# Patient Record
Sex: MALE | Race: WHITE | NOT HISPANIC OR LATINO | Employment: FULL TIME | URBAN - METROPOLITAN AREA
[De-identification: names, ages, dates, MRNs, and addresses within clinical notes are randomized per-mention and may not be internally consistent; named-entity substitution may affect disease eponyms.]

---

## 2020-10-30 ENCOUNTER — OFFICE VISIT (OUTPATIENT)
Dept: FAMILY MEDICINE CLINIC | Facility: CLINIC | Age: 30
End: 2020-10-30

## 2020-10-30 ENCOUNTER — LAB (OUTPATIENT)
Dept: FAMILY MEDICINE CLINIC | Facility: CLINIC | Age: 30
End: 2020-10-30

## 2020-10-30 VITALS
TEMPERATURE: 98.6 F | HEIGHT: 68 IN | SYSTOLIC BLOOD PRESSURE: 127 MMHG | HEART RATE: 89 BPM | DIASTOLIC BLOOD PRESSURE: 80 MMHG | BODY MASS INDEX: 30.92 KG/M2 | OXYGEN SATURATION: 97 % | WEIGHT: 204 LBS

## 2020-10-30 DIAGNOSIS — R51.9 NONINTRACTABLE EPISODIC HEADACHE, UNSPECIFIED HEADACHE TYPE: Primary | ICD-10-CM

## 2020-10-30 DIAGNOSIS — Z23 ENCOUNTER FOR IMMUNIZATION: ICD-10-CM

## 2020-10-30 LAB
ALBUMIN SERPL-MCNC: 4.8 G/DL (ref 3.5–5.2)
ALBUMIN/GLOB SERPL: 2.1 G/DL
ALP SERPL-CCNC: 73 U/L (ref 39–117)
ALT SERPL W P-5'-P-CCNC: 21 U/L (ref 1–41)
ANION GAP SERPL CALCULATED.3IONS-SCNC: 8.4 MMOL/L (ref 5–15)
AST SERPL-CCNC: 21 U/L (ref 1–40)
BASOPHILS # BLD AUTO: 0.05 10*3/MM3 (ref 0–0.2)
BASOPHILS NFR BLD AUTO: 0.8 % (ref 0–1.5)
BILIRUB SERPL-MCNC: 0.5 MG/DL (ref 0–1.2)
BUN SERPL-MCNC: 12 MG/DL (ref 6–20)
BUN/CREAT SERPL: 12.1 (ref 7–25)
CALCIUM SPEC-SCNC: 10.3 MG/DL (ref 8.6–10.5)
CHLORIDE SERPL-SCNC: 105 MMOL/L (ref 98–107)
CO2 SERPL-SCNC: 29.6 MMOL/L (ref 22–29)
CREAT SERPL-MCNC: 0.99 MG/DL (ref 0.76–1.27)
DEPRECATED RDW RBC AUTO: 41.1 FL (ref 37–54)
EOSINOPHIL # BLD AUTO: 0.17 10*3/MM3 (ref 0–0.4)
EOSINOPHIL NFR BLD AUTO: 2.8 % (ref 0.3–6.2)
ERYTHROCYTE [DISTWIDTH] IN BLOOD BY AUTOMATED COUNT: 12.6 % (ref 12.3–15.4)
GFR SERPL CREATININE-BSD FRML MDRD: 90 ML/MIN/1.73
GLOBULIN UR ELPH-MCNC: 2.3 GM/DL
GLUCOSE SERPL-MCNC: 90 MG/DL (ref 65–99)
HCT VFR BLD AUTO: 44 % (ref 37.5–51)
HGB BLD-MCNC: 14.7 G/DL (ref 13–17.7)
IMM GRANULOCYTES # BLD AUTO: 0.02 10*3/MM3 (ref 0–0.05)
IMM GRANULOCYTES NFR BLD AUTO: 0.3 % (ref 0–0.5)
LYMPHOCYTES # BLD AUTO: 2.28 10*3/MM3 (ref 0.7–3.1)
LYMPHOCYTES NFR BLD AUTO: 37.6 % (ref 19.6–45.3)
MCH RBC QN AUTO: 30.2 PG (ref 26.6–33)
MCHC RBC AUTO-ENTMCNC: 33.4 G/DL (ref 31.5–35.7)
MCV RBC AUTO: 90.5 FL (ref 79–97)
MONOCYTES # BLD AUTO: 0.42 10*3/MM3 (ref 0.1–0.9)
MONOCYTES NFR BLD AUTO: 6.9 % (ref 5–12)
NEUTROPHILS NFR BLD AUTO: 3.13 10*3/MM3 (ref 1.7–7)
NEUTROPHILS NFR BLD AUTO: 51.6 % (ref 42.7–76)
NRBC BLD AUTO-RTO: 0 /100 WBC (ref 0–0.2)
PLATELET # BLD AUTO: 288 10*3/MM3 (ref 140–450)
PMV BLD AUTO: 10 FL (ref 6–12)
POTASSIUM SERPL-SCNC: 4 MMOL/L (ref 3.5–5.2)
PROT SERPL-MCNC: 7.1 G/DL (ref 6–8.5)
RBC # BLD AUTO: 4.86 10*6/MM3 (ref 4.14–5.8)
SODIUM SERPL-SCNC: 143 MMOL/L (ref 136–145)
WBC # BLD AUTO: 6.07 10*3/MM3 (ref 3.4–10.8)

## 2020-10-30 PROCEDURE — 80053 COMPREHEN METABOLIC PANEL: CPT | Performed by: FAMILY MEDICINE

## 2020-10-30 PROCEDURE — 36415 COLL VENOUS BLD VENIPUNCTURE: CPT | Performed by: FAMILY MEDICINE

## 2020-10-30 PROCEDURE — 85025 COMPLETE CBC W/AUTO DIFF WBC: CPT | Performed by: FAMILY MEDICINE

## 2020-10-30 PROCEDURE — 90686 IIV4 VACC NO PRSV 0.5 ML IM: CPT | Performed by: FAMILY MEDICINE

## 2020-10-30 PROCEDURE — 99213 OFFICE O/P EST LOW 20 MIN: CPT | Performed by: FAMILY MEDICINE

## 2020-10-30 PROCEDURE — 90471 IMMUNIZATION ADMIN: CPT | Performed by: FAMILY MEDICINE

## 2020-10-30 NOTE — PROGRESS NOTES
"  Subjective:     Ambrose Powers is a 29 y.o. male who presents for  Chief Complaint   Patient presents with   • Headache     new pt- c/o migraines        This is a new patient to me.    HPI    Obese  male presents with complaints of headaches for the last 3 weeks, typically during the work week.  Headaches typically begins midday, reaches maximum intensity by 6pm, and then gradually improves later in the evening.  Patient believes improvement of headaches in the evenings may be related to an opportunity to decompress following work.  Reports he has a 20-minute drive home that is relaxing.  Headaches are localized to the crown.  Described as a dull ache. Associated symptoms include lightheadedness, sensitivity to light, and visual disturbance, \"out of focus\".  Reports episodes of self-limiting, sharp, electric-like pain at the site of his headache during the course of the day in recent days.  Cannot recall any stressors or life changes. Patient works first shift and eats lunch around 1 pm; drinks ~60 oz of water daily. Therapy initiated at home includes Excedrin migraine, 1 tablet daily since he cannot tolerate caffeine.  Although Excedrin helps with headache, Excedrin does not help lightheadedness. Has adjusted the blue filter on his phone and computer and has appreciated some benefit.  Denies any history of head trauma.    Past Medical Hx:  History reviewed. No pertinent past medical history.    Past Surgical Hx:  History reviewed. No pertinent surgical history.    Family Hx:  Family History   Problem Relation Age of Onset   • No Known Problems Mother    • No Known Problems Father         Social History:  Social History     Socioeconomic History   • Marital status: Single     Spouse name: Not on file   • Number of children: Not on file   • Years of education: Not on file   • Highest education level: Not on file   Tobacco Use   • Smoking status: Current Some Day Smoker     Packs/day: 1.00     Years: 3.00 " "    Pack years: 3.00     Types: Cigarettes     Last attempt to quit: 2018     Years since quittin.7   • Smokeless tobacco: Never Used   • Tobacco comment: currently using E-cigarette, 6 mg   Substance and Sexual Activity   • Alcohol use: Yes     Frequency: Monthly or less     Comment: 2-4 drinks every other week   • Drug use: Never   • Sexual activity: Yes     Partners: Female     Birth control/protection: Condom       Allergies:  Patient has no known allergies.    Current Meds:  No current outpatient medications on file.    The following portions of the patient's history were reviewed and updated as appropriate: allergies, current medications, past family history, past medical history, past social history, past surgical history and problem list.    Review of Systems  Review of Systems   Constitutional: Negative for chills, diaphoresis, fatigue and fever.   HENT: Negative for congestion, rhinorrhea, sinus pressure, sneezing and sore throat.    Eyes: Positive for visual disturbance. Negative for blurred vision, double vision and redness.   Respiratory: Negative for cough, shortness of breath and wheezing.    Cardiovascular: Negative for chest pain and leg swelling.   Gastrointestinal: Negative for abdominal pain, constipation, diarrhea, nausea and vomiting.   Genitourinary: Negative for difficulty urinating, dysuria and hematuria.   Musculoskeletal: Negative for arthralgias, gait problem and myalgias.   Skin: Negative for rash and skin lesions.   Allergic/Immunologic: Positive for environmental allergies.   Neurological: Positive for light-headedness and headache. Negative for tremors, seizures and syncope.   Psychiatric/Behavioral: Negative for sleep disturbance and depressed mood. The patient is not nervous/anxious.        Objective:     /80 (BP Location: Left arm, Patient Position: Sitting, Cuff Size: Large Adult)   Pulse 89   Temp 98.6 °F (37 °C) (Infrared)   Ht 172.7 cm (68\")   Wt 92.5 kg (204 " lb)   SpO2 97%   BMI 31.02 kg/m²     Physical Exam  Vitals signs reviewed.   Constitutional:       General: He is not in acute distress.     Appearance: He is well-developed. He is obese. He is not diaphoretic.   HENT:      Head: Normocephalic and atraumatic.      Right Ear: Tympanic membrane and ear canal normal.      Left Ear: Tympanic membrane and ear canal normal.      Nose: Nose normal.      Mouth/Throat:      Mouth: Mucous membranes are moist.      Pharynx: Oropharynx is clear.   Eyes:      General: No scleral icterus.     Conjunctiva/sclera: Conjunctivae normal.      Pupils: Pupils are equal, round, and reactive to light.   Neck:      Musculoskeletal: Neck supple.      Thyroid: No thyromegaly.      Trachea: No tracheal deviation.   Cardiovascular:      Rate and Rhythm: Normal rate and regular rhythm.      Heart sounds: Normal heart sounds.   Pulmonary:      Effort: Pulmonary effort is normal.      Breath sounds: Normal breath sounds. No wheezing.   Lymphadenopathy:      Cervical: No cervical adenopathy.   Skin:     General: Skin is warm and dry.      Capillary Refill: Capillary refill takes less than 2 seconds.      Findings: No rash.   Neurological:      Mental Status: He is alert and oriented to person, place, and time.      Cranial Nerves: Cranial nerves are intact.      Deep Tendon Reflexes:      Reflex Scores:       Bicep reflexes are 2+ on the right side and 2+ on the left side.       Patellar reflexes are 3+ on the right side and 3+ on the left side.  Psychiatric:         Mood and Affect: Mood normal.         Behavior: Behavior normal.         Lab Results   Component Value Date    HGB 15.0 05/27/2020     No results found for: GLUCOSE, BUN, CREATININE, EGFRIFNONA, EGFRIFAFRI, BCR, K, CO2, CALCIUM, PROTENTOTREF, ALBUMIN, LABIL2, BILIRUBIN, AST, ALT     Assessment/Plan:     Diagnoses and all orders for this visit:    1. Nonintractable episodic headache, unspecified headache type  (Primary)  Comments:  Migraine vs. tension headaches.  Try diclofenac & < 3 g acetaminophen/24 hr for abortive tx.  Head CT to evaluate visual disturbances.  Pt to monitor headaches.  Orders:  -     CT Head Without Contrast  -     diclofenac (VOLTAREN) 50 MG EC tablet; Take 1 tablet by mouth 2 (Two) Times a Day As Needed (Headache). Take with food!  Dispense: 60 tablet; Refill: 1  -     CBC w AUTO Differential  -     Comprehensive metabolic panel  -     CBC Auto Differential    2. Encounter for immunization  -     Fluarix Quad >6 Months ([id number])      Follow-up:     Return in about 6 weeks (around 12/11/2020) for Recheck headaches.      Signature    Sonam Hoarn MD  Family Medicine  [facility]        This document has been electronically signed by Sonam Horan MD on October 30, 2020 09:03 EDT

## 2020-10-30 NOTE — PATIENT INSTRUCTIONS
Recommend no more than 3000 mg Tylenol (acetaminophen) in 24 hours. Extra strength Tylenol (acetaminophen) is 500 mg. You can take 2 tablets up to 3 times per day as needed for pain.       Form - Headache Record  There are many types and causes of headaches. A headache record can help guide your treatment plan. Use this form to record the details. Bring this form with you to your follow-up visits.  Follow your health care provider's instructions on how to describe your headache. You may be asked to:  · Use a pain scale. This is a tool to rate the intensity of your headache using words or numbers.  · Describe what your headache feels like, such as dull, achy, throbbing, or sharp.  Headache record  Date: _______________ Time (from start to end): ____________________ Location of the headache: _________________________  · Intensity of the headache: ____________________ Description of the headache: ______________________________________________________________  · Hours of sleep the night before the headache: __________  · Food or drinks before the headache started: ______________________________________________________________________________________  · Events before the headache started: _______________________________________________________________________________________________  · Symptoms before the headache started: __________________________________________________________________________________________  · Symptoms during the headache: __________________________________________________________________________________________________  · Treatment: ________________________________________________________________________________________________________________  · Effect of treatment: _________________________________________________________________________________________________________  · Other comments:  ___________________________________________________________________________________________________________  Date: _______________ Time (from start to end): ____________________ Location of the headache: _________________________  · Intensity of the headache: ____________________ Description of the headache: ______________________________________________________________  · Hours of sleep the night before the headache: __________  · Food or drinks before the headache started: ______________________________________________________________________________________  · Events before the headache started: ____________________________________________________________________________________________  · Symptoms before the headache started: _________________________________________________________________________________________  · Symptoms during the headache: _______________________________________________________________________________________________  · Treatment: ________________________________________________________________________________________________________________  · Effect of treatment: _________________________________________________________________________________________________________  · Other comments: ___________________________________________________________________________________________________________  Date: _______________ Time (from start to end): ____________________ Location of the headache: _________________________  · Intensity of the headache: ____________________ Description of the headache: ______________________________________________________________  · Hours of sleep the night before the headache: __________  · Food or drinks before the headache started: ______________________________________________________________________________________  · Events before the headache started: ____________________________________________________________________________________________  · Symptoms  before the headache started: _________________________________________________________________________________________  · Symptoms during the headache: _______________________________________________________________________________________________  · Treatment: ________________________________________________________________________________________________________________  · Effect of treatment: _________________________________________________________________________________________________________  · Other comments: ___________________________________________________________________________________________________________  Date: _______________ Time (from start to end): ____________________ Location of the headache: _________________________  · Intensity of the headache: ____________________ Description of the headache: ______________________________________________________________  · Hours of sleep the night before the headache: _________  · Food or drinks before the headache started: ______________________________________________________________________________________  · Events before the headache started: ____________________________________________________________________________________________  · Symptoms before the headache started: _________________________________________________________________________________________  · Symptoms during the headache: _______________________________________________________________________________________________  · Treatment: ________________________________________________________________________________________________________________  · Effect of treatment: _________________________________________________________________________________________________________  · Other comments: ___________________________________________________________________________________________________________  Date: _______________ Time (from start to end): ____________________ Location of  the headache: _________________________  · Intensity of the headache: ____________________ Description of the headache: ______________________________________________________________  · Hours of sleep the night before the headache: _________  · Food or drinks before the headache started: ______________________________________________________________________________________  · Events before the headache started: ____________________________________________________________________________________________  · Symptoms before the headache started: _________________________________________________________________________________________  · Symptoms during the headache: _______________________________________________________________________________________________  · Treatment: ________________________________________________________________________________________________________________  · Effect of treatment: _________________________________________________________________________________________________________  · Other comments: ___________________________________________________________________________________________________________  This information is not intended to replace advice given to you by your health care provider. Make sure you discuss any questions you have with your health care provider.  Document Released: 01/06/2020 Document Revised: 01/06/2020 Document Reviewed: 01/06/2020  Elsevier Patient Education © 2020 [employer]    General Headache Without Cause  A headache is pain or discomfort felt around the head or neck area. The specific cause of a headache may not be found. There are many causes and types of headaches. A few common ones are:  · Tension headaches.  · Migraine headaches.  · Cluster headaches.  · Chronic daily headaches.  Follow these instructions at home:  Watch your condition for any changes. Let your health care provider know about them. Take these steps to help with your  condition:  Managing pain         · Take over-the-counter and prescription medicines only as told by your health care provider.  · Lie down in a dark, quiet room when you have a headache.  · If directed, put ice on your head and neck area:  ? Put ice in a plastic bag.  ? Place a towel between your skin and the bag.  ? Leave the ice on for 20 minutes, 2-3 times per day.  · If directed, apply heat to the affected area. Use the heat source that your health care provider recommends, such as a moist heat pack or a heating pad.  ? Place a towel between your skin and the heat source.  ? Leave the heat on for 20-30 minutes.  ? Remove the heat if your skin turns bright red. This is especially important if you are unable to feel pain, heat, or cold. You may have a greater risk of getting burned.  · Keep lights dim if bright lights bother you or make your headaches worse.  Eating and drinking  · Eat meals on a regular schedule.  · If you drink alcohol:  ? Limit how much you use to:  § 0-1 drink a day for women.  § 0-2 drinks a day for men.  ? Be aware of how much alcohol is in your drink. In the [place] one drink equals one 12 oz bottle of beer (355 mL), one 5 oz glass of wine (148 mL), or one 1½ oz glass of hard liquor (44 mL).  · Stop drinking caffeine, or decrease the amount of caffeine you drink.  General instructions    · Keep a headache journal to help find out what may trigger your headaches. For example, write down:  ? What you eat and drink.  ? How much sleep you get.  ? Any change to your diet or medicines.  · Try massage or other relaxation techniques.  · Limit stress.  · Sit up straight, and do not tense your muscles.  · Do not use any products that contain nicotine or tobacco, such as cigarettes, e-cigarettes, and chewing tobacco. If you need help quitting, ask your health care provider.  · Exercise regularly as told by your health care provider.  · Sleep on a regular schedule. Get 7-9 hours of sleep each night, or  the amount recommended by your health care provider.  · Keep all follow-up visits as told by your health care provider. This is important.  Contact a health care provider if:  · Your symptoms are not helped by medicine.  · You have a headache that is different from the usual headache.  · You have nausea or you vomit.  · You have a fever.  Get help right away if:  · Your headache becomes severe quickly.  · Your headache gets worse after moderate to intense physical activity.  · You have repeated vomiting.  · You have a stiff neck.  · You have a loss of vision.  · You have problems with speech.  · You have pain in the eye or ear.  · You have muscular weakness or loss of muscle control.  · You lose your balance or have trouble walking.  · You feel faint or pass out.  · You have confusion.  · You have a seizure.  Summary  · A headache is pain or discomfort felt around the head or neck area.  · There are many causes and types of headaches. In some cases, the cause may not be found.  · Keep a headache journal to help find out what may trigger your headaches. Watch your condition for any changes. Let your health care provider know about them.  · Contact a health care provider if you have a headache that is different from the usual headache, or if your symptoms are not helped by medicine.  · Get help right away if your headache becomes severe, you vomit, you have a loss of vision, you lose your balance, or you have a seizure.  This information is not intended to replace advice given to you by your health care provider. Make sure you discuss any questions you have with your health care provider.  Document Released: 12/18/2006 Document Revised: 07/08/2019 Document Reviewed: 07/08/2019  Elsevier Patient Education © 2020 [employer] Inc[employer]

## 2020-11-16 ENCOUNTER — HOSPITAL ENCOUNTER (OUTPATIENT)
Dept: CT IMAGING | Facility: HOSPITAL | Age: 30
Discharge: HOME | End: 2020-11-16
Admitting: FAMILY MEDICINE

## 2020-11-16 PROCEDURE — 70450 CT HEAD/BRAIN W/O DYE: CPT

## 2020-12-11 ENCOUNTER — OFFICE VISIT (OUTPATIENT)
Dept: FAMILY MEDICINE CLINIC | Facility: CLINIC | Age: 30
End: 2020-12-11

## 2020-12-11 VITALS
BODY MASS INDEX: 31.52 KG/M2 | WEIGHT: 208 LBS | DIASTOLIC BLOOD PRESSURE: 89 MMHG | HEART RATE: 83 BPM | HEIGHT: 68 IN | TEMPERATURE: 97.5 F | SYSTOLIC BLOOD PRESSURE: 131 MMHG | OXYGEN SATURATION: 99 %

## 2020-12-11 DIAGNOSIS — R51.9 NONINTRACTABLE EPISODIC HEADACHE, UNSPECIFIED HEADACHE TYPE: Primary | ICD-10-CM

## 2020-12-11 PROCEDURE — 99213 OFFICE O/P EST LOW 20 MIN: CPT | Performed by: FAMILY MEDICINE

## 2020-12-11 NOTE — PROGRESS NOTES
Subjective:     Ambrose Powers is a 29 y.o. male who presents for  Chief Complaint   Patient presents with   • Headache     follow up        This is a known patient to me.     Obese  male presents with complaints of daily headaches presents for follow up. Headaches were occuring without warning most afternoons and then improve as patient returned home from work.  Underwent a head CT that was unremarkable.  Patient was started on diclofenac BID PRN. Reports that headache frequency has decreased to once a week. Initially taking NSAID daily but since frequency has decrease, need for NSAID has decreased. Reports significant benefit from NSAID, headache resolved within an hour. Patient reports that he could not identify any triggers but has since been trying to get adequate sleep and exercise.     Past Medical Hx:  History reviewed. No pertinent past medical history.    Past Surgical Hx:  History reviewed. No pertinent surgical history.    Family Hx:  Family History   Problem Relation Age of Onset   • No Known Problems Mother    • No Known Problems Father         Social History:  Social History     Socioeconomic History   • Marital status: Single     Spouse name: Not on file   • Number of children: Not on file   • Years of education: Not on file   • Highest education level: Not on file   Tobacco Use   • Smoking status: Former Smoker     Packs/day: 1.00     Years: 3.00     Pack years: 3.00     Types: Cigarettes     Quit date: 2018     Years since quittin.8   • Smokeless tobacco: Never Used   • Tobacco comment: currently using E-cigarette, 6 mg   Substance and Sexual Activity   • Alcohol use: Yes     Frequency: Monthly or less     Comment: 2-4 drinks every other week   • Drug use: Never   • Sexual activity: Yes     Partners: Female     Birth control/protection: Condom       Allergies:  Patient has no known allergies.    Current Meds:    Current Outpatient Medications:   •  diclofenac (VOLTAREN) 50 MG  "EC tablet, Take 1 tablet by mouth 2 (Two) Times a Day As Needed (Headache). Take with food!, Disp: 60 tablet, Rfl: 1    The following portions of the patient's history were reviewed and updated as appropriate: allergies, current medications, past family history, past medical history, past social history, past surgical history and problem list.    Review of Systems  Review of Systems   Constitutional: Negative for chills, diaphoresis, fatigue and fever.   HENT: Negative for congestion, rhinorrhea, sinus pressure, sneezing and sore throat.    Eyes: Negative for blurred vision, double vision, redness and visual disturbance.   Respiratory: Negative for cough, shortness of breath and wheezing.    Cardiovascular: Negative for chest pain and leg swelling.   Gastrointestinal: Negative for abdominal pain, constipation, diarrhea, nausea and vomiting.   Genitourinary: Negative for difficulty urinating, dysuria and hematuria.   Musculoskeletal: Negative for arthralgias, gait problem and myalgias.   Skin: Negative for rash and skin lesions.   Allergic/Immunologic: Negative for environmental allergies.   Neurological: Positive for headache. Negative for tremors, seizures, syncope and light-headedness.   Psychiatric/Behavioral: Negative for sleep disturbance and depressed mood. The patient is not nervous/anxious.        Objective:     /89 (BP Location: Left arm, Patient Position: Sitting, Cuff Size: Large Adult)   Pulse 83   Temp 97.5 °F (36.4 °C) (Infrared)   Ht 172.7 cm (68\")   Wt 94.3 kg (208 lb)   SpO2 99%   BMI 31.63 kg/m²     Physical Exam  Vitals signs reviewed.   Constitutional:       General: He is not in acute distress.     Appearance: He is well-developed. He is obese. He is not diaphoretic.   HENT:      Head: Normocephalic and atraumatic.   Eyes:      General: No scleral icterus.     Extraocular Movements: Extraocular movements intact.      Conjunctiva/sclera: Conjunctivae normal.   Neck:      " Musculoskeletal: Normal range of motion.   Cardiovascular:      Rate and Rhythm: Normal rate and regular rhythm.      Heart sounds: Normal heart sounds.   Pulmonary:      Effort: Pulmonary effort is normal.      Breath sounds: Normal breath sounds. No wheezing.   Abdominal:      General: Bowel sounds are normal.      Palpations: Abdomen is soft.      Tenderness: There is no abdominal tenderness.   Skin:     General: Skin is warm and dry.      Capillary Refill: Capillary refill takes less than 2 seconds.      Findings: No rash.   Neurological:      Mental Status: He is alert and oriented to person, place, and time.   Psychiatric:         Mood and Affect: Mood normal.         Behavior: Behavior normal.         Lab Results   Component Value Date    WBC 6.07 10/30/2020    HGB 14.7 10/30/2020    HCT 44.0 10/30/2020    MCV 90.5 10/30/2020     10/30/2020     Lab Results   Component Value Date    GLUCOSE 90 10/30/2020    BUN 12 10/30/2020    CREATININE 0.99 10/30/2020    EGFRIFNONA 90 10/30/2020    BCR 12.1 10/30/2020    K 4.0 10/30/2020    CO2 29.6 (H) 10/30/2020    CALCIUM 10.3 10/30/2020    ALBUMIN 4.80 10/30/2020    AST 21 10/30/2020    ALT 21 10/30/2020        Assessment/Plan:     Diagnoses and all orders for this visit:    1. Nonintractable episodic headache, unspecified headache type (Primary)  Comments:  Continue PRN NSAID.  Encouraged supportive care: adequate hydration, sleep, & exercise.  Educational material in AVS.  RTO if symptoms worsen.      Follow-up:     Return in about 6 months (around 6/11/2021) for Annual Physical Exam.      Signature    Sonam Horan MD  Family Medicine  [facility]        This document has been electronically signed by Sonam Horan MD on December 11, 2020 08:14 EST

## 2020-12-11 NOTE — PATIENT INSTRUCTIONS
General Headache Without Cause  A headache is pain or discomfort felt around the head or neck area. The specific cause of a headache may not be found. There are many causes and types of headaches. A few common ones are:  · Tension headaches.  · Migraine headaches.  · Cluster headaches.  · Chronic daily headaches.  Follow these instructions at home:  Watch your condition for any changes. Let your health care provider know about them. Take these steps to help with your condition:  Managing pain         · Take over-the-counter and prescription medicines only as told by your health care provider.  · Lie down in a dark, quiet room when you have a headache.  · If directed, put ice on your head and neck area:  ? Put ice in a plastic bag.  ? Place a towel between your skin and the bag.  ? Leave the ice on for 20 minutes, 2-3 times per day.  · If directed, apply heat to the affected area. Use the heat source that your health care provider recommends, such as a moist heat pack or a heating pad.  ? Place a towel between your skin and the heat source.  ? Leave the heat on for 20-30 minutes.  ? Remove the heat if your skin turns bright red. This is especially important if you are unable to feel pain, heat, or cold. You may have a greater risk of getting burned.  · Keep lights dim if bright lights bother you or make your headaches worse.  Eating and drinking  · Eat meals on a regular schedule.  · If you drink alcohol:  ? Limit how much you use to:  § 0-1 drink a day for women.  § 0-2 drinks a day for men.  ? Be aware of how much alcohol is in your drink. In the [place] one drink equals one 12 oz bottle of beer (355 mL), one 5 oz glass of wine (148 mL), or one 1½ oz glass of hard liquor (44 mL).  · Stop drinking caffeine, or decrease the amount of caffeine you drink.  General instructions    · Keep a headache journal to help find out what may trigger your headaches. For example, write down:  ? What you eat and drink.  ? How much  sleep you get.  ? Any change to your diet or medicines.  · Try massage or other relaxation techniques.  · Limit stress.  · Sit up straight, and do not tense your muscles.  · Do not use any products that contain nicotine or tobacco, such as cigarettes, e-cigarettes, and chewing tobacco. If you need help quitting, ask your health care provider.  · Exercise regularly as told by your health care provider.  · Sleep on a regular schedule. Get 7-9 hours of sleep each night, or the amount recommended by your health care provider.  · Keep all follow-up visits as told by your health care provider. This is important.  Contact a health care provider if:  · Your symptoms are not helped by medicine.  · You have a headache that is different from the usual headache.  · You have nausea or you vomit.  · You have a fever.  Get help right away if:  · Your headache becomes severe quickly.  · Your headache gets worse after moderate to intense physical activity.  · You have repeated vomiting.  · You have a stiff neck.  · You have a loss of vision.  · You have problems with speech.  · You have pain in the eye or ear.  · You have muscular weakness or loss of muscle control.  · You lose your balance or have trouble walking.  · You feel faint or pass out.  · You have confusion.  · You have a seizure.  Summary  · A headache is pain or discomfort felt around the head or neck area.  · There are many causes and types of headaches. In some cases, the cause may not be found.  · Keep a headache journal to help find out what may trigger your headaches. Watch your condition for any changes. Let your health care provider know about them.  · Contact a health care provider if you have a headache that is different from the usual headache, or if your symptoms are not helped by medicine.  · Get help right away if your headache becomes severe, you vomit, you have a loss of vision, you lose your balance, or you have a seizure.  This information is not  intended to replace advice given to you by your health care provider. Make sure you discuss any questions you have with your health care provider.  Document Revised: 07/08/2019 Document Reviewed: 07/08/2019  Elsevier Patient Education © 2020 [employer]

## 2021-01-24 ENCOUNTER — PATIENT MESSAGE (OUTPATIENT)
Dept: FAMILY MEDICINE CLINIC | Facility: CLINIC | Age: 31
End: 2021-01-24

## 2021-01-25 NOTE — TELEPHONE ENCOUNTER
From: Ambrose Powers  To: Sonam Horan MD  Sent: 1/24/2021 4:10 PM EST  Subject: Non-Urgent Medical Question    I am having a little pain and a little pressure in my chest when I bend over or move in certain ways. Is this just due acid reflux or is it something I need to visit the doctor for?    Thank you

## 2021-01-27 ENCOUNTER — HOSPITAL ENCOUNTER (EMERGENCY)
Facility: HOSPITAL | Age: 31
Discharge: HOME | End: 2021-01-27
Admitting: EMERGENCY MEDICINE

## 2021-01-27 ENCOUNTER — APPOINTMENT (OUTPATIENT)
Dept: GENERAL RADIOLOGY | Facility: HOSPITAL | Age: 31
End: 2021-01-27

## 2021-01-27 VITALS
RESPIRATION RATE: 16 BRPM | WEIGHT: 203.71 LBS | TEMPERATURE: 98.1 F | HEART RATE: 79 BPM | DIASTOLIC BLOOD PRESSURE: 74 MMHG | SYSTOLIC BLOOD PRESSURE: 122 MMHG | HEIGHT: 70 IN | OXYGEN SATURATION: 98 % | BODY MASS INDEX: 29.16 KG/M2

## 2021-01-27 DIAGNOSIS — K21.00 GASTROESOPHAGEAL REFLUX DISEASE WITH ESOPHAGITIS WITHOUT HEMORRHAGE: Primary | ICD-10-CM

## 2021-01-27 DIAGNOSIS — R07.89 ATYPICAL CHEST PAIN: ICD-10-CM

## 2021-01-27 LAB
ALBUMIN SERPL-MCNC: 4.9 G/DL (ref 3.5–5.2)
ALBUMIN/GLOB SERPL: 1.7 G/DL
ALP SERPL-CCNC: 98 U/L (ref 39–117)
ALT SERPL W P-5'-P-CCNC: 16 U/L (ref 1–41)
ANION GAP SERPL CALCULATED.3IONS-SCNC: 14 MMOL/L (ref 5–15)
AST SERPL-CCNC: 18 U/L (ref 1–40)
BASOPHILS # BLD AUTO: 0 10*3/MM3 (ref 0–0.2)
BASOPHILS NFR BLD AUTO: 0.4 % (ref 0–1.5)
BILIRUB SERPL-MCNC: 0.6 MG/DL (ref 0–1.2)
BUN SERPL-MCNC: 15 MG/DL (ref 6–20)
BUN/CREAT SERPL: 12.7 (ref 7–25)
CALCIUM SPEC-SCNC: 10.3 MG/DL (ref 8.6–10.5)
CHLORIDE SERPL-SCNC: 102 MMOL/L (ref 98–107)
CO2 SERPL-SCNC: 26 MMOL/L (ref 22–29)
CREAT SERPL-MCNC: 1.18 MG/DL (ref 0.76–1.27)
D DIMER PPP FEU-MCNC: <0.19 MG/L (FEU) (ref 0–0.59)
DEPRECATED RDW RBC AUTO: 40.7 FL (ref 37–54)
EOSINOPHIL # BLD AUTO: 0.1 10*3/MM3 (ref 0–0.4)
EOSINOPHIL NFR BLD AUTO: 1.8 % (ref 0.3–6.2)
ERYTHROCYTE [DISTWIDTH] IN BLOOD BY AUTOMATED COUNT: 12.9 % (ref 12.3–15.4)
GFR SERPL CREATININE-BSD FRML MDRD: 73 ML/MIN/1.73
GLOBULIN UR ELPH-MCNC: 2.9 GM/DL
GLUCOSE SERPL-MCNC: 96 MG/DL (ref 65–99)
HCT VFR BLD AUTO: 44.9 % (ref 37.5–51)
HGB BLD-MCNC: 15.6 G/DL (ref 13–17.7)
HOLD SPECIMEN: NORMAL
LYMPHOCYTES # BLD AUTO: 1.6 10*3/MM3 (ref 0.7–3.1)
LYMPHOCYTES NFR BLD AUTO: 23 % (ref 19.6–45.3)
MCH RBC QN AUTO: 31.3 PG (ref 26.6–33)
MCHC RBC AUTO-ENTMCNC: 34.7 G/DL (ref 31.5–35.7)
MCV RBC AUTO: 90.4 FL (ref 79–97)
MONOCYTES # BLD AUTO: 0.3 10*3/MM3 (ref 0.1–0.9)
MONOCYTES NFR BLD AUTO: 5 % (ref 5–12)
NEUTROPHILS NFR BLD AUTO: 4.8 10*3/MM3 (ref 1.7–7)
NEUTROPHILS NFR BLD AUTO: 69.8 % (ref 42.7–76)
NRBC BLD AUTO-RTO: 0 /100 WBC (ref 0–0.2)
NT-PROBNP SERPL-MCNC: 12.8 PG/ML (ref 0–450)
PLATELET # BLD AUTO: 313 10*3/MM3 (ref 140–450)
PMV BLD AUTO: 8.3 FL (ref 6–12)
POTASSIUM SERPL-SCNC: 3.8 MMOL/L (ref 3.5–5.2)
PROT SERPL-MCNC: 7.8 G/DL (ref 6–8.5)
RBC # BLD AUTO: 4.97 10*6/MM3 (ref 4.14–5.8)
SODIUM SERPL-SCNC: 142 MMOL/L (ref 136–145)
TROPONIN T SERPL-MCNC: <0.01 NG/ML (ref 0–0.03)
WBC # BLD AUTO: 6.8 10*3/MM3 (ref 3.4–10.8)
WHOLE BLOOD HOLD SPECIMEN: NORMAL

## 2021-01-27 PROCEDURE — 84484 ASSAY OF TROPONIN QUANT: CPT | Performed by: NURSE PRACTITIONER

## 2021-01-27 PROCEDURE — 85025 COMPLETE CBC W/AUTO DIFF WBC: CPT | Performed by: NURSE PRACTITIONER

## 2021-01-27 PROCEDURE — 80053 COMPREHEN METABOLIC PANEL: CPT | Performed by: NURSE PRACTITIONER

## 2021-01-27 PROCEDURE — 85379 FIBRIN DEGRADATION QUANT: CPT | Performed by: NURSE PRACTITIONER

## 2021-01-27 PROCEDURE — 83880 ASSAY OF NATRIURETIC PEPTIDE: CPT | Performed by: NURSE PRACTITIONER

## 2021-01-27 PROCEDURE — 71045 X-RAY EXAM CHEST 1 VIEW: CPT

## 2021-01-27 PROCEDURE — 93005 ELECTROCARDIOGRAM TRACING: CPT

## 2021-01-27 PROCEDURE — 99284 EMERGENCY DEPT VISIT MOD MDM: CPT

## 2021-01-27 RX ORDER — SODIUM CHLORIDE 0.9 % (FLUSH) 0.9 %
10 SYRINGE (ML) INJECTION AS NEEDED
Status: DISCONTINUED | OUTPATIENT
Start: 2021-01-27 | End: 2021-01-27 | Stop reason: HOSPADM

## 2021-01-27 RX ORDER — FAMOTIDINE 20 MG/1
20 TABLET, FILM COATED ORAL 2 TIMES DAILY
Qty: 20 TABLET | Refills: 0 | Status: SHIPPED | OUTPATIENT
Start: 2021-01-27 | End: 2021-02-06

## 2021-01-27 RX ADMIN — SODIUM CHLORIDE 500 ML: 9 INJECTION, SOLUTION INTRAVENOUS at 14:07

## 2021-01-27 RX ADMIN — LIDOCAINE HYDROCHLORIDE: 20 SOLUTION ORAL; TOPICAL at 16:29

## 2021-01-29 LAB — QT INTERVAL: 348 MS

## 2021-08-24 ENCOUNTER — PATIENT MESSAGE (OUTPATIENT)
Dept: FAMILY MEDICINE CLINIC | Facility: CLINIC | Age: 31
End: 2021-08-24

## 2021-08-24 NOTE — TELEPHONE ENCOUNTER
From: Ambrose Powers  To: Sonam Horan MD  Sent: 8/24/2021 9:09 AM EDT  Subject: Non-Urgent Medical Question    I have what I believe is a swollen lymph node on my lower to middle side of my neck. I noticed it yesterday. Is it something I should have looked at or is it just something that I can treat at home?

## 2022-07-12 ENCOUNTER — OFFICE VISIT (OUTPATIENT)
Dept: FAMILY MEDICINE CLINIC | Facility: CLINIC | Age: 32
End: 2022-07-12

## 2022-07-12 VITALS
HEART RATE: 81 BPM | DIASTOLIC BLOOD PRESSURE: 78 MMHG | BODY MASS INDEX: 23.62 KG/M2 | TEMPERATURE: 98.7 F | OXYGEN SATURATION: 98 % | HEIGHT: 70 IN | SYSTOLIC BLOOD PRESSURE: 132 MMHG | WEIGHT: 165 LBS

## 2022-07-12 DIAGNOSIS — Z00.00 ANNUAL PHYSICAL EXAM: Primary | ICD-10-CM

## 2022-07-12 PROCEDURE — 99395 PREV VISIT EST AGE 18-39: CPT | Performed by: FAMILY MEDICINE

## 2022-07-12 NOTE — PROGRESS NOTES
"Assessment and Plan     Problem List Items Addressed This Visit    None     Visit Diagnoses     Annual physical exam    -  Primary    Relevant Orders    CBC Auto Differential    Comprehensive Metabolic Panel    Lipid Panel    Hepatitis C Antibody      Encouraged 150 minutes of moderate intensity activity weekly.   Encouraged regular dental visits.   Encouraged regular seat belt use.   Encouraged safe sex practices.   Patient provided with educational material regarding preventive health care in AVS.    Return in about 1 year (around 7/12/2023) for Annual Physical Exam.        Patient was given instructions and counseling regarding his condition or for health maintenance advice. Please see specific information pulled into the AVS if appropriate.        Ambrose is a 31 y.o. being seen today for  Annual Exam   Subjective   History of the Present Illness     Patient presents for an annual physical exam.    Diet: weighing food (5 oz of protein BID); fruits and vegetables (4 oz each BID); 120 oz water  Exercise: 4x/week; cardiovascular and weight  Dentist: greater than a year ago    Seatbelt Use: regular    Colon Cancer Screening: NA.   Prostate Cancer Screening: NA.     Normotensive in office.     Social History  He  reports that he quit smoking about 4 years ago. His smoking use included cigarettes. He has a 3.00 pack-year smoking history. He has never used smokeless tobacco. He reports current alcohol use. He reports that he does not use drugs.  Objective   Vital Signs          Vitals:    07/12/22 0944   BP: 132/78   BP Location: Left arm   Patient Position: Sitting   Cuff Size: Adult   Pulse: 81   Temp: 98.7 °F (37.1 °C)   TempSrc: Oral   SpO2: 98%   Weight: 74.8 kg (165 lb)   Height: 177.8 cm (70\")     BP Readings from Last 1 Encounters:   07/12/22 132/78     Wt Readings from Last 3 Encounters:   07/12/22 74.8 kg (165 lb)   01/27/21 92.4 kg (203 lb 11.3 oz)   12/11/20 94.3 kg (208 lb)   Body mass index is 23.68 " kg/m².     Physical Exam  Vitals reviewed.   Constitutional:       General: He is not in acute distress.     Appearance: Normal appearance.   HENT:      Head: Normocephalic and atraumatic.      Right Ear: Tympanic membrane and ear canal normal.      Left Ear: Tympanic membrane and ear canal normal.      Mouth/Throat:      Mouth: Mucous membranes are moist.      Pharynx: Oropharynx is clear.   Eyes:      Extraocular Movements: Extraocular movements intact.      Pupils: Pupils are equal, round, and reactive to light.   Cardiovascular:      Rate and Rhythm: Normal rate.      Heart sounds: Normal heart sounds.   Pulmonary:      Effort: Pulmonary effort is normal.      Breath sounds: Normal breath sounds.   Abdominal:      General: Bowel sounds are normal.      Palpations: Abdomen is soft.      Tenderness: There is no abdominal tenderness.   Musculoskeletal:      Right lower leg: No edema.      Left lower leg: No edema.   Neurological:      Mental Status: He is alert and oriented to person, place, and time.   Psychiatric:         Mood and Affect: Mood normal.         Behavior: Behavior normal.               Comprehensive Metabolic Panel (01/27/2021 14:05)  CBC & Differential (01/27/2021 14:05)

## 2022-07-14 ENCOUNTER — LAB (OUTPATIENT)
Dept: FAMILY MEDICINE CLINIC | Facility: CLINIC | Age: 32
End: 2022-07-14

## 2022-07-14 LAB
ALBUMIN SERPL-MCNC: 4.8 G/DL (ref 3.5–5.2)
ALBUMIN/GLOB SERPL: 2.4 G/DL
ALP SERPL-CCNC: 77 U/L (ref 39–117)
ALT SERPL W P-5'-P-CCNC: 12 U/L (ref 1–41)
ANION GAP SERPL CALCULATED.3IONS-SCNC: 11.1 MMOL/L (ref 5–15)
AST SERPL-CCNC: 19 U/L (ref 1–40)
BASOPHILS # BLD AUTO: 0.03 10*3/MM3 (ref 0–0.2)
BASOPHILS NFR BLD AUTO: 0.6 % (ref 0–1.5)
BILIRUB SERPL-MCNC: 1 MG/DL (ref 0–1.2)
BUN SERPL-MCNC: 14 MG/DL (ref 6–20)
BUN/CREAT SERPL: 12.1 (ref 7–25)
CALCIUM SPEC-SCNC: 9.4 MG/DL (ref 8.6–10.5)
CHLORIDE SERPL-SCNC: 105 MMOL/L (ref 98–107)
CHOLEST SERPL-MCNC: 172 MG/DL (ref 0–200)
CO2 SERPL-SCNC: 25.9 MMOL/L (ref 22–29)
CREAT SERPL-MCNC: 1.16 MG/DL (ref 0.76–1.27)
DEPRECATED RDW RBC AUTO: 39.6 FL (ref 37–54)
EGFRCR SERPLBLD CKD-EPI 2021: 86.9 ML/MIN/1.73
EOSINOPHIL # BLD AUTO: 0.12 10*3/MM3 (ref 0–0.4)
EOSINOPHIL NFR BLD AUTO: 2.3 % (ref 0.3–6.2)
ERYTHROCYTE [DISTWIDTH] IN BLOOD BY AUTOMATED COUNT: 11.9 % (ref 12.3–15.4)
GLOBULIN UR ELPH-MCNC: 2 GM/DL
GLUCOSE SERPL-MCNC: 81 MG/DL (ref 65–99)
HCT VFR BLD AUTO: 43.3 % (ref 37.5–51)
HCV AB SER DONR QL: NORMAL
HDLC SERPL-MCNC: 43 MG/DL (ref 40–60)
HGB BLD-MCNC: 14.6 G/DL (ref 13–17.7)
IMM GRANULOCYTES # BLD AUTO: 0.01 10*3/MM3 (ref 0–0.05)
IMM GRANULOCYTES NFR BLD AUTO: 0.2 % (ref 0–0.5)
LDLC SERPL CALC-MCNC: 113 MG/DL (ref 0–100)
LDLC/HDLC SERPL: 2.61 {RATIO}
LYMPHOCYTES # BLD AUTO: 2.16 10*3/MM3 (ref 0.7–3.1)
LYMPHOCYTES NFR BLD AUTO: 40.8 % (ref 19.6–45.3)
MCH RBC QN AUTO: 30.7 PG (ref 26.6–33)
MCHC RBC AUTO-ENTMCNC: 33.7 G/DL (ref 31.5–35.7)
MCV RBC AUTO: 91.2 FL (ref 79–97)
MONOCYTES # BLD AUTO: 0.37 10*3/MM3 (ref 0.1–0.9)
MONOCYTES NFR BLD AUTO: 7 % (ref 5–12)
NEUTROPHILS NFR BLD AUTO: 2.6 10*3/MM3 (ref 1.7–7)
NEUTROPHILS NFR BLD AUTO: 49.1 % (ref 42.7–76)
NRBC BLD AUTO-RTO: 0 /100 WBC (ref 0–0.2)
PLATELET # BLD AUTO: 280 10*3/MM3 (ref 140–450)
PMV BLD AUTO: 9.7 FL (ref 6–12)
POTASSIUM SERPL-SCNC: 4.5 MMOL/L (ref 3.5–5.2)
PROT SERPL-MCNC: 6.8 G/DL (ref 6–8.5)
RBC # BLD AUTO: 4.75 10*6/MM3 (ref 4.14–5.8)
SODIUM SERPL-SCNC: 142 MMOL/L (ref 136–145)
TRIGL SERPL-MCNC: 83 MG/DL (ref 0–150)
VLDLC SERPL-MCNC: 16 MG/DL (ref 5–40)
WBC NRBC COR # BLD: 5.29 10*3/MM3 (ref 3.4–10.8)

## 2022-07-14 PROCEDURE — 85025 COMPLETE CBC W/AUTO DIFF WBC: CPT | Performed by: FAMILY MEDICINE

## 2022-07-14 PROCEDURE — 80053 COMPREHEN METABOLIC PANEL: CPT | Performed by: FAMILY MEDICINE

## 2022-07-14 PROCEDURE — 80061 LIPID PANEL: CPT | Performed by: FAMILY MEDICINE

## 2022-07-14 PROCEDURE — 86803 HEPATITIS C AB TEST: CPT | Performed by: FAMILY MEDICINE

## 2022-07-14 PROCEDURE — 36415 COLL VENOUS BLD VENIPUNCTURE: CPT | Performed by: FAMILY MEDICINE

## 2024-05-15 ENCOUNTER — NURSE TRIAGE (OUTPATIENT)
Dept: CALL CENTER | Facility: HOSPITAL | Age: 34
End: 2024-05-15
Payer: COMMERCIAL

## 2024-05-15 NOTE — TELEPHONE ENCOUNTER
"Patient called the [facility] to make a new patient appointment for testicle pain. The [facility] made the appointment for this Friday, but they need to have the patient evaluated first.   Triage completed and patient advised to keep his appointment on Friday. I did advise him to call back if he gets worse in the mean time. He said that he will follow this advise.   Reason for Disposition   [1] Brief pain in scrotum or testicle AND [2] present < 1 hour AND [3] recurrent  (NO swelling)    Additional Information   Negative: [1] Rash or color change of scrotum BUT [2] no swelling or pain   Negative: Inguinal hernia previously diagnosed by a physician   Negative: Followed a genital area injury (e.g., penis, scrotum)   Negative: Swelling of scrotum is main symptom   Negative: SEVERE pain (e.g., excruciating)   Negative: Swollen scrotum   Negative: [1] Constant pain in scrotum or testicle AND [2] present > 1 hour   Negative: Vomiting   Negative: Fever > 100.4 F (38.0 C)   Negative: Patient sounds very sick or weak to the triager   Negative: Scrotum looks infected (e.g., draining sore, ulcer, red rash)   Negative: Blood in urine (red, pink, or tea-colored)   Negative: [1] Pain comes and goes (intermittent) AND [2] present > 24 hours    Answer Assessment - Initial Assessment Questions  1. LOCATION and RADIATION: \"Where is the pain located?\"       Right testicle   2. QUALITY: \"What does the pain feel like?\"  (e.g., sharp, dull, aching, burning)     Dull 1/10   3. SEVERITY: \"How bad is the pain?\"  (Scale 1-10; or mild, moderate, severe)    - MILD (1-3): doesn't interfere with normal activities     - MODERATE (4-7): interferes with normal activities (e.g., work or school) or awakens from sleep    - SEVERE (8-10): excruciating pain, unable to do any normal activities, difficulty walking  mild  4. ONSET: \"When did the pain start?\"  Last Friday  5. PATTERN: \"Does it come and go, or has it been constant since it started?\"     Constant   6. " "SCROTAL APPEARANCE: \"What does the scrotum look like?\" \"Is there any swelling or redness?\"       Looks normal   7. HERNIA: \"Has a doctor ever told you that you have a hernia?\"      No   8. OTHER SYMPTOMS: \"Do you have any other symptoms?\" (e.g., fever, abdominal pain, vomiting, difficulty passing urine)      No    Protocols used: Scrotal Pain-ADULT-AH    "

## 2024-05-17 ENCOUNTER — OFFICE VISIT (OUTPATIENT)
Dept: INTERNAL MEDICINE | Facility: CLINIC | Age: 34
End: 2024-05-17
Payer: COMMERCIAL

## 2024-05-17 ENCOUNTER — PATIENT ROUNDING (BHMG ONLY) (OUTPATIENT)
Dept: INTERNAL MEDICINE | Facility: CLINIC | Age: 34
End: 2024-05-17
Payer: COMMERCIAL

## 2024-05-17 VITALS
HEART RATE: 86 BPM | WEIGHT: 181 LBS | DIASTOLIC BLOOD PRESSURE: 90 MMHG | TEMPERATURE: 98.4 F | SYSTOLIC BLOOD PRESSURE: 124 MMHG | OXYGEN SATURATION: 98 % | BODY MASS INDEX: 26.81 KG/M2 | HEIGHT: 69 IN

## 2024-05-17 DIAGNOSIS — N45.1 EPIDIDYMITIS: Primary | ICD-10-CM

## 2024-05-17 DIAGNOSIS — Z00.00 ENCOUNTER FOR MEDICAL EXAMINATION TO ESTABLISH CARE: ICD-10-CM

## 2024-05-17 PROCEDURE — 99213 OFFICE O/P EST LOW 20 MIN: CPT

## 2024-05-17 RX ORDER — LEVOFLOXACIN 500 MG/1
500 TABLET, FILM COATED ORAL DAILY
Qty: 10 TABLET | Refills: 0 | Status: SHIPPED | OUTPATIENT
Start: 2024-05-17 | End: 2024-05-27

## 2024-05-17 NOTE — PROGRESS NOTES
"Chief Complaint  Establish Care and Testicle Pain (x1 week)    Subjective        Ambrose Powers presents to [facility]  History of Present Illness  33-year-old male presenting with testicular pain and to establish care.  Originally from [place] and moved to [place] couple years ago.  Engaged and getting  in September.  Planning to adopt in the future.  Started having right testicular pain about a week ago.  Denies any trauma or injury.  Denies changes in urination or ejaculation.  States the pain is a 1 out of 10 and annoying.  Is with him throughout the day but most noticeable when trying to sleep.  Has been treating with ibuprofen as needed.  Denies any swelling, rashes or abdominal pain.    Has a very regimented diet since having significant weight gain about 10 years ago.  At one point was 230 pounds.  Was very active in high school but had poor eating habits and lack of activity during college.  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    No family history of cancers, diabetes or strokes.  States he had a grandparent that had a heart attack but unsure when.  Testicle Pain  The patient's primary symptoms include testicular pain.       Objective   Vital Signs:  /90 (BP Location: Right arm, Patient Position: Sitting, Cuff Size: Adult)   Pulse 86   Temp 98.4 °F (36.9 °C)   Ht 175.3 cm (69\")   Wt 82.1 kg (181 lb)   SpO2 98%   BMI 26.73 kg/m²   Estimated body mass index is 26.73 kg/m² as calculated from the following:    Height as of this encounter: 175.3 cm (69\").    Weight as of this encounter: 82.1 kg (181 lb).             Physical Exam  Vitals reviewed.   Constitutional:       Appearance: Normal appearance.   Genitourinary:     Penis: Normal.       Testes: Normal.      Epididymis:      Right: Inflamed.   Musculoskeletal:         General: Normal " range of motion.      Cervical back: Normal range of motion.   Skin:     General: Skin is warm and dry.      Capillary Refill: Capillary refill takes less than 2 seconds.   Neurological:      General: No focal deficit present.      Mental Status: He is alert and oriented to person, place, and time.   Psychiatric:         Mood and Affect: Mood normal.         Behavior: Behavior normal.         Thought Content: Thought content normal.         Judgment: Judgment normal.        Result Review :            No current outpatient medications on file prior to visit.     No current facility-administered medications on file prior to visit.                Assessment and Plan     Diagnoses and all orders for this visit:    1. Epididymitis (Primary)  -     levoFLOXacin (Levaquin) 500 MG tablet; Take 1 tablet by mouth Daily for 10 days.  Dispense: 10 tablet; Refill: 0    2. Encounter for medical examination to establish care        Patient Instructions   Take Levaquin as prescribed. Conservative measures for testicular pain. Ibuprofen as needed. Stay hydrated with water. Follow-up in 4 months for annual exam and fasting labs.            Follow Up     Return in about 4 months (around 9/17/2024) for Annual physical.  Patient was given instructions and counseling regarding his condition or for health maintenance advice. Please see specific information pulled into the AVS if appropriate.

## 2024-05-17 NOTE — PATIENT INSTRUCTIONS
Take Levaquin as prescribed. Conservative measures for testicular pain. Ibuprofen as needed. Stay hydrated with water. Follow-up in 4 months for annual exam and fasting labs.

## 2024-08-23 ENCOUNTER — OFFICE VISIT (OUTPATIENT)
Dept: INTERNAL MEDICINE | Facility: CLINIC | Age: 34
End: 2024-08-23
Payer: COMMERCIAL

## 2024-08-23 VITALS
HEIGHT: 69 IN | DIASTOLIC BLOOD PRESSURE: 82 MMHG | OXYGEN SATURATION: 97 % | TEMPERATURE: 98.5 F | RESPIRATION RATE: 20 BRPM | HEART RATE: 70 BPM | WEIGHT: 187 LBS | SYSTOLIC BLOOD PRESSURE: 122 MMHG | BODY MASS INDEX: 27.7 KG/M2

## 2024-08-23 DIAGNOSIS — M77.9 TENDONITIS: Primary | ICD-10-CM

## 2024-08-23 PROCEDURE — 99212 OFFICE O/P EST SF 10 MIN: CPT

## 2024-08-23 NOTE — PROGRESS NOTES
"Chief Complaint  Foot Pain (Left)    Subjective        Ambrose Powers presents to [facility]  History of Present Illness  33-year-old male presenting with left foot pain.  States that he ran into different shoes on Monday.  Started having pain Monday night.  Got worse Tuesday but is now been improving.  Applied ice first few days.  Applied heat last night.  Has been taking ibuprofen.  Was walking on crutches earlier in the week.  Is now able to bear weight and walk without crutches.  Denies any swelling, bruising.  Foot Pain        Objective   Vital Signs:  /82 (BP Location: Left arm)   Pulse 70   Temp 98.5 °F (36.9 °C) (Oral)   Resp 20   Ht 175.3 cm (69\")   Wt 84.8 kg (187 lb)   SpO2 97%   BMI 27.62 kg/m²   Estimated body mass index is 27.62 kg/m² as calculated from the following:    Height as of this encounter: 175.3 cm (69\").    Weight as of this encounter: 84.8 kg (187 lb).       BMI is >= 25 and <30. (Overweight) The following options were offered after discussion;: nutrition counseling/recommendations      Physical Exam  Vitals reviewed.   Constitutional:       Appearance: Normal appearance.   Musculoskeletal:         General: Tenderness present. Normal range of motion.      Cervical back: Normal range of motion.        Feet:    Skin:     General: Skin is warm and dry.      Capillary Refill: Capillary refill takes less than 2 seconds.   Neurological:      General: No focal deficit present.      Mental Status: He is alert and oriented to person, place, and time.   Psychiatric:         Mood and Affect: Mood normal.         Behavior: Behavior normal.         Thought Content: Thought content normal.         Judgment: Judgment normal.        Result Review :            No current outpatient medications on file prior to visit.     No current facility-administered medications on file prior to visit.                Assessment and Plan     Diagnoses and all orders for this " visit:    1. Tendonitis (Primary)        Patient Instructions   Recommend 800 mg ibuprofen every 8 hours as needed for pain. Recommend heat to the area. Weight bearing as tolerated. Avoid standing for extended periods of time. Follow-up as needed.            Follow Up     Return if symptoms worsen or fail to improve.  Patient was given instructions and counseling regarding his condition or for health maintenance advice. Please see specific information pulled into the AVS if appropriate.

## 2024-08-23 NOTE — PATIENT INSTRUCTIONS
Recommend 800 mg ibuprofen every 8 hours as needed for pain. Recommend heat to the area. Weight bearing as tolerated. Avoid standing for extended periods of time. Follow-up as needed.

## 2024-09-19 ENCOUNTER — OFFICE VISIT (OUTPATIENT)
Dept: INTERNAL MEDICINE | Facility: CLINIC | Age: 34
End: 2024-09-19
Payer: COMMERCIAL

## 2024-09-19 VITALS
HEART RATE: 78 BPM | OXYGEN SATURATION: 98 % | DIASTOLIC BLOOD PRESSURE: 74 MMHG | WEIGHT: 189 LBS | BODY MASS INDEX: 27.91 KG/M2 | SYSTOLIC BLOOD PRESSURE: 108 MMHG

## 2024-09-19 DIAGNOSIS — Z30.09 ENCOUNTER FOR VASECTOMY COUNSELING: ICD-10-CM

## 2024-09-19 DIAGNOSIS — Z00.00 ANNUAL PHYSICAL EXAM: Primary | ICD-10-CM

## 2024-09-19 LAB
ALBUMIN SERPL-MCNC: 4.8 G/DL (ref 3.5–5.2)
ALBUMIN/GLOB SERPL: 2.2 G/DL
ALP SERPL-CCNC: 81 U/L (ref 39–117)
ALT SERPL-CCNC: 27 U/L (ref 1–41)
APPEARANCE UR: CLEAR
AST SERPL-CCNC: 25 U/L (ref 1–40)
BASOPHILS # BLD AUTO: 0.05 10*3/MM3 (ref 0–0.2)
BASOPHILS NFR BLD AUTO: 1 % (ref 0–1.5)
BILIRUB SERPL-MCNC: 1.2 MG/DL (ref 0–1.2)
BILIRUB UR QL STRIP: NEGATIVE
BUN SERPL-MCNC: 15 MG/DL (ref 6–20)
BUN/CREAT SERPL: 13.8 (ref 7–25)
CALCIUM SERPL-MCNC: 9.8 MG/DL (ref 8.6–10.5)
CHLORIDE SERPL-SCNC: 101 MMOL/L (ref 98–107)
CHOLEST SERPL-MCNC: 210 MG/DL (ref 0–200)
CHOLEST/HDLC SERPL: 4.38 {RATIO}
CO2 SERPL-SCNC: 27.3 MMOL/L (ref 22–29)
COLOR UR: YELLOW
CREAT SERPL-MCNC: 1.09 MG/DL (ref 0.76–1.27)
EGFRCR SERPLBLD CKD-EPI 2021: 92.5 ML/MIN/1.73
EOSINOPHIL # BLD AUTO: 0.13 10*3/MM3 (ref 0–0.4)
EOSINOPHIL NFR BLD AUTO: 2.6 % (ref 0.3–6.2)
ERYTHROCYTE [DISTWIDTH] IN BLOOD BY AUTOMATED COUNT: 12.8 % (ref 12.3–15.4)
GLOBULIN SER CALC-MCNC: 2.2 GM/DL
GLUCOSE SERPL-MCNC: 80 MG/DL (ref 65–99)
GLUCOSE UR QL STRIP: NEGATIVE
HCT VFR BLD AUTO: 45.7 % (ref 37.5–51)
HDLC SERPL-MCNC: 48 MG/DL (ref 40–60)
HGB BLD-MCNC: 15.1 G/DL (ref 13–17.7)
HGB UR QL STRIP: NEGATIVE
IMM GRANULOCYTES # BLD AUTO: 0.01 10*3/MM3 (ref 0–0.05)
IMM GRANULOCYTES NFR BLD AUTO: 0.2 % (ref 0–0.5)
KETONES UR QL STRIP: NEGATIVE
LDLC SERPL CALC-MCNC: 146 MG/DL (ref 0–100)
LEUKOCYTE ESTERASE UR QL STRIP: NEGATIVE
LYMPHOCYTES # BLD AUTO: 1.89 10*3/MM3 (ref 0.7–3.1)
LYMPHOCYTES NFR BLD AUTO: 37.3 % (ref 19.6–45.3)
MCH RBC QN AUTO: 30.5 PG (ref 26.6–33)
MCHC RBC AUTO-ENTMCNC: 33 G/DL (ref 31.5–35.7)
MCV RBC AUTO: 92.3 FL (ref 79–97)
MONOCYTES # BLD AUTO: 0.34 10*3/MM3 (ref 0.1–0.9)
MONOCYTES NFR BLD AUTO: 6.7 % (ref 5–12)
NEUTROPHILS # BLD AUTO: 2.65 10*3/MM3 (ref 1.7–7)
NEUTROPHILS NFR BLD AUTO: 52.2 % (ref 42.7–76)
NITRITE UR QL STRIP: NEGATIVE
NRBC BLD AUTO-RTO: 0 /100 WBC (ref 0–0.2)
PH UR STRIP: 8 [PH] (ref 5–8)
PLATELET # BLD AUTO: 273 10*3/MM3 (ref 140–450)
POTASSIUM SERPL-SCNC: 4.5 MMOL/L (ref 3.5–5.2)
PROT SERPL-MCNC: 7 G/DL (ref 6–8.5)
PROT UR QL STRIP: NEGATIVE
RBC # BLD AUTO: 4.95 10*6/MM3 (ref 4.14–5.8)
SODIUM SERPL-SCNC: 138 MMOL/L (ref 136–145)
SP GR UR STRIP: 1.02 (ref 1–1.03)
TRIGL SERPL-MCNC: 91 MG/DL (ref 0–150)
TSH SERPL DL<=0.005 MIU/L-ACNC: 1.34 UIU/ML (ref 0.27–4.2)
UROBILINOGEN UR STRIP-MCNC: NORMAL MG/DL
VLDLC SERPL CALC-MCNC: 16 MG/DL (ref 5–40)
WBC # BLD AUTO: 5.07 10*3/MM3 (ref 3.4–10.8)

## 2024-09-19 PROCEDURE — 99395 PREV VISIT EST AGE 18-39: CPT

## 2025-01-07 ENCOUNTER — PATIENT MESSAGE (OUTPATIENT)
Dept: INTERNAL MEDICINE | Facility: CLINIC | Age: 35
End: 2025-01-07
Payer: COMMERCIAL

## 2025-01-07 DIAGNOSIS — Z30.09 ENCOUNTER FOR VASECTOMY COUNSELING: Primary | ICD-10-CM

## 2025-03-31 ENCOUNTER — OFFICE VISIT (OUTPATIENT)
Dept: INTERNAL MEDICINE | Facility: CLINIC | Age: 35
End: 2025-03-31
Payer: COMMERCIAL

## 2025-03-31 VITALS
BODY MASS INDEX: 29 KG/M2 | DIASTOLIC BLOOD PRESSURE: 83 MMHG | WEIGHT: 195.8 LBS | TEMPERATURE: 98 F | SYSTOLIC BLOOD PRESSURE: 122 MMHG | OXYGEN SATURATION: 98 % | HEART RATE: 78 BPM | HEIGHT: 69 IN

## 2025-03-31 DIAGNOSIS — F98.8 ATTENTION DEFICIT DISORDER, UNSPECIFIED TYPE: Primary | ICD-10-CM

## 2025-03-31 PROCEDURE — 99213 OFFICE O/P EST LOW 20 MIN: CPT

## 2025-03-31 RX ORDER — ATOMOXETINE 10 MG/1
10 CAPSULE ORAL DAILY
Qty: 30 CAPSULE | Refills: 2 | Status: SHIPPED | OUTPATIENT
Start: 2025-03-31 | End: 2025-04-03 | Stop reason: SINTOL

## 2025-03-31 RX ORDER — MULTIPLE VITAMINS W/ MINERALS TAB 9MG-400MCG
1 TAB ORAL DAILY
COMMUNITY

## 2025-03-31 NOTE — PROGRESS NOTES
"Chief Complaint  ADHD (Would like to start back taking medication )    Subjective        Ambrose LUQUE Priya presents to [facility]  History of Present Illness  34-year-old male presenting with ADD.   in September.  States that nothing much is changed because they were together for so long and already living together.      Was previously diagnosed with ADD in high school.  States he was on medication previously but did not want to continue being on stimulants.  Through adulthood has been able to manage without medication.  Currently switched roles at work and is no longer working from home.  Finds it extremely more difficult to focus and to time manage now that he has restrictions in his day.  States that he does not have many hyperactive tendencies.  States that he is able to fall asleep well.    ADHD posible medication  Onset was at an unknown time.   Pertinent negative symptoms include no abdominal pain, no anorexia, no joint pain, no change in stool, no chest pain, no chills, no congestion, no cough, no diaphoresis, no fatigue, no fever, no headaches, no joint swelling, no myalgias, no nausea, no neck pain, no numbness, no rash, no sore throat, no swollen glands, no dysuria, no vertigo, no visual change, no vomiting and no weakness.       Objective   Vital Signs:  /83 (BP Location: Left arm, Patient Position: Sitting, Cuff Size: Large Adult)   Pulse 78   Temp 98 °F (36.7 °C) (Temporal)   Ht 175.3 cm (69.02\")   Wt 88.8 kg (195 lb 12.8 oz)   SpO2 98%   BMI 28.90 kg/m²   Estimated body mass index is 28.9 kg/m² as calculated from the following:    Height as of this encounter: 175.3 cm (69.02\").    Weight as of this encounter: 88.8 kg (195 lb 12.8 oz).               Physical Exam  Vitals reviewed.   Constitutional:       Appearance: Normal appearance.   Musculoskeletal:         General: Normal range of motion.      Cervical back: Normal range of motion.   Skin:     " General: Skin is warm and dry.      Capillary Refill: Capillary refill takes less than 2 seconds.   Neurological:      General: No focal deficit present.      Mental Status: He is alert and oriented to person, place, and time.   Psychiatric:         Mood and Affect: Mood normal.         Behavior: Behavior normal.         Thought Content: Thought content normal.         Judgment: Judgment normal.        Result Review :      Common labs          9/19/2024    10:33   Common Labs   Glucose 80    BUN 15    Creatinine 1.09    Sodium 138    Potassium 4.5    Chloride 101    Calcium 9.8    Albumin 4.8    Total Bilirubin 1.2    Alkaline Phosphatase 81    AST (SGOT) 25    ALT (SGPT) 27    WBC 5.07    Hemoglobin 15.1    Hematocrit 45.7    Platelets 273    Total Cholesterol 210    Triglycerides 91    HDL Cholesterol 48    LDL Cholesterol  146          Current Outpatient Medications on File Prior to Visit   Medication Sig Dispense Refill    multivitamin with minerals tablet tablet Take 1 tablet by mouth Daily.       No current facility-administered medications on file prior to visit.                Assessment and Plan     Diagnoses and all orders for this visit:    1. Attention deficit disorder, unspecified type (Primary)  -     atomoxetine (Strattera) 10 MG capsule; Take 1 capsule by mouth Daily.  Dispense: 30 capsule; Refill: 2  -     Ambulatory Referral to Psychiatry        Patient Instructions   Start Straterra 10 mg daily. Monitor for side effects. Referral for psychiatry placed. Scheduling center to call with appointment.  Follow-up in 4 weeks for recheck.            Follow Up     Return in about 4 weeks (around 4/28/2025) for Recheck.  Patient was given instructions and counseling regarding his condition or for health maintenance advice. Please see specific information pulled into the AVS if appropriate.

## 2025-03-31 NOTE — PATIENT INSTRUCTIONS
Start Straterra 10 mg daily. Monitor for side effects. Referral for psychiatry placed. Scheduling center to call with appointment.  Follow-up in 4 weeks for recheck.

## 2025-04-03 ENCOUNTER — TELEMEDICINE (OUTPATIENT)
Age: 35
End: 2025-04-03
Payer: COMMERCIAL

## 2025-04-03 DIAGNOSIS — F90.2 ATTENTION DEFICIT HYPERACTIVITY DISORDER, COMBINED TYPE: Primary | ICD-10-CM

## 2025-04-03 RX ORDER — DEXTROAMPHETAMINE SACCHARATE, AMPHETAMINE ASPARTATE, DEXTROAMPHETAMINE SULFATE AND AMPHETAMINE SULFATE 2.5; 2.5; 2.5; 2.5 MG/1; MG/1; MG/1; MG/1
10 TABLET ORAL
Qty: 30 TABLET | Refills: 0 | Status: SHIPPED | OUTPATIENT
Start: 2025-04-03 | End: 2025-05-06 | Stop reason: ALTCHOICE

## 2025-04-03 NOTE — PROGRESS NOTES
"    Initial Office Note     Name: Ambrose Powers    : 1990     MRN: [id number]     PCP: Ambrose Varner APRN     Referring: DAVID Snyder     Chief Complaint  Poor concentration/focus, needing to get back on ADHD medication    Subjective     This provider is located at their office at [facility], located at [address] [place] [address], using a secure ESKYt Video Visit through [employer]. Patient is being seen remotely via telehealth at their home residence in [place]. The patient's condition being diagnosed/treated is appropriate for telemedicine. The provider identified herself as well as her credentials. The patient gives consent to be seen remotely, but they may refuse to be seen remotely at any time.    History of Present Illness: Ambrose Powers is a 34 y.o. male presenting to [facility] via telehealth for an initial psychiatric assessment.  Patient reports that he has a history of ADHD that was first diagnosed when he was \"really young\".  He recalls that he had gotten into a lot of impulsive fights after getting started in school, struggled to sit still, and frequently got in trouble for interrupting others.  He also recalls that there was \"lots of procrastination ALWAYS\".  He avoided any kind of schoolwork until right before he was due, and struggled to meet deadlines because of this.  Patient recalls that he was prescribed an unknown stimulant at that time, as well as maybe Abilify, and thinks that his behavior and concentration improved significantly on these medications.  He stopped taking his medications for sometime in high school, leading to impaired concentration and falling grades, so he was restarted on ADHD medications (Vyvanse), again with good effect.  Patient had been off of any treatment for ADHD for the past 10 years or so, but was prompted to get help for his ADHD due to anticipated changes to his work responsibilities.  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  He knows that he will need to remain focused and stay on task in order to get his work done efficiently.  Patient initially went to his PCP for ADHD medications and was prescribed low-dose Strattera.  He has been taking it for 3 days now, and although he has noted \"maybe a tiny bit\" of improvement since starting the medication, it has resulted in complete loss of appetite that he does not recall experiencing when being treated with stimulants as a young child but does recall significant weight loss on Vyvanse during high school.  Regarding any history of mood concerns, patient says that he sometimes feels anxious and depressed, but believes that these are within normal limits, time-limited, and only occur with situations that might prompt these feelings.  He denies any recent mood concerns, and states that his mood on average is \"pretty positive\".  He currently denies any issues with interest, energy, and motivation. Regarding sleep, he says that \"my Apple Watch says 7 hours, but I think it's more like 6 and would like more\". Appetite is \"nonexistent\" as noted above, but he forces himself to maintain his typical intake. Pt denies any history of suicidal thoughts or attempts.  Screening for history of marium and hypomania as well as history of psychotic symptoms including AVH or paranoia are negative.  Patient expresses strong hope that he will be able to succeed in his new work responsibilities if he gets restarted on appropriate medication for ADHD. Patient adamantly and convincingly denies current suicidal or homicidal ideation or perceptual disturbance.      Current psych medications: Strattera 10 mg QD - has only taken for 3 days, already has no appetite    Previous medication trials: Vyyanse - effective but caused intolerable insomnia and massive unwanted weight loss      Significant " "Life Events  Has patient been through or witnessed a divorce? no    Has patient experienced a death / loss of relationship? yes  Recent loss of coworker (father's friend) by suicide    Has patient experienced a major accident or tragic events? no    Has patient experienced any other significant life events or trauma (such as verbal, physical, sexual abuse)? no      Work History  Highest level of education obtained: 12th grade, some college    Ever been active duty in the ? 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 Although working from home is more manageable than working in person due to having less distractions, pt much prefers working in person with other people and plans to take on an in-person position if his ADHD gets under control    Legal History  DUI at 22 or 23, was wiped from record. No current legal issues.     Interpersonal/Relational  Marital Status:   Patient's current living situation: Lives with wife of about half a year, reports that relationship is very good  Support system: significant other, friends, and patient siblings  Difficulty getting along with peers: no  Difficulty making new friendships: no  Difficulty maintaining friendships: no  Close with family members: yes  Childhood: Grew up in [place], and spent most of life in that area. Dad was often traveling due to his work in sales. Mom stayed at home and raised pt.     Mental/Behavioral Health History  History of prior hospitalization: None  Previous psychiatrist: \"Probably when I was a little kid\", but otherwise no history  Therapist: None  Previous medication trials: Vyyanse - effective but caused intolerable insomnia and massive unwanted weight loss; unknown ADHD medication when very young, possible along with Abilify  History of suicide attempts/self-injurious " behavior: Denies    Medical History    Past Medical History:   Diagnosis Date    Acid reflux     ADHD (attention deficit hyperactivity disorder)     Allergic     Seasonal     Are there any significant health issues (current or past): Denies    History of seizures: no    Family History   Problem Relation Age of Onset    No Known Problems Mother     No Known Problems Father        Family Psychiatric History  Older sister - ADHD  Younger sister - anxiety  No known history of substance abuse or suicide attempts.    Current Medications:   Current Outpatient Medications   Medication Sig Dispense Refill    atomoxetine (Strattera) 10 MG capsule Take 1 capsule by mouth Daily. 30 capsule 2    multivitamin with minerals tablet tablet Take 1 tablet by mouth Daily.       No current facility-administered medications for this visit.       History of Substance Use:   Only substance use that pt endorses is alcohol. Admits to drinking to excess when he was in college, and got DUI in early 20s that ended up being dropped from his record. Currently drinks only on weekends, approx 5 beers over the course of the weekend. Denies history of complex withdrawals, Dts. Denies use of tobacco or nicotine products. Current caffeine use is about 4 cups of coffee over the course of the morning, never drinks coffee after early afternoon.     Objective       SUICIDE RISK ASSESSMENT/CSSRS  1. Does patient have thoughts of suicide? no  2. Does patient have intent for suicide? no  3. Does patient have a current plan for suicide? no  4. History of suicide attempts: no  5. Family history of suicide or attempts: no  6. History of violent behaviors towards others or property or thoughts of committing homicide: no  7. History of sexual aggression toward others: no  8. Access to firearms or weapons: yes    Mental Status Exam:   Hygiene:   good  Cooperation:  Cooperative  Eye Contact:  Good  Psychomotor Behavior:  Appropriate  Affect:  Appropriate,  "Restricted, and euthymic  Mood:  \"pretty happy\"  Hopelessness: Denies  Speech:  Normal  Thought Process:  Goal directed and Linear  Thought Content:  Normal  Suicidal:  None  Homicidal:  None  Hallucinations:  None  Delusion:  None  Memory:  Intact  Orientation:  Person, Place, Time, and Situation  Reliability:  good  Insight:  Good  Judgement:  Good  Impulse Control:  Fair      Assessment and Plan     Impression/Formulation:     Diagnosis Plan   1. Attention deficit hyperactivity disorder, combined type  amphetamine-dextroamphetamine (Adderall) 10 MG tablet          Ambrose Powers presented today for their initial psychiatric evaluation for complaint of untreated ADHD. He was diagnosed early in childhood and was treated on/off with stimulant medications until going off all medications about 10 years ago.  He is now interested in resuming medication as he is anticipating a major change in his work responsibilities and environment that will require him to stay focused and attentive throughout the day. Although pt has found some improvement in focus after starting Strattera, SE of impaired appetite is intolerable to him. No current concern for NATA, and long-term history is strongly supportive of ADHD. Will start with low-dose Adderall IR in the morning to assess for any positive or negative response. If some improvement and tolerating well, pt will likely require further titration to adequately manage symptoms.     Plan:   - Discontinue Strattera due to significant adverse SE of impaired appetite even at starting dose.   - Start trial of Adderall for ADHD symptoms. Will start with Adderall IR 10 mg QAM to establish tolerability and titrate as needed. Pt has been encouraged to call or message clinic 1-2 weeks after initiating to report any response or side effect.  - SELENE reviewed via PDMP - no concerns.  - Patient will adhere to medication regimen as prescribed and report any side effects.   - Patient will contact " this office, call 911 or present to the nearest emergency room should suicidal or homicidal impulses occur.  - Recommended beginning psychotherapy. Pt not interested at this time.   - RTC in 1 month for follow-up. Plan to order UDS at that time to assess for appropriate stimulant use as well as screen for illicit substance use. Pt aware, has no concerns.      If patient develops suicidal or homicidal impulses, patient will present to the nearest hospital for an assessment. Advised patient of [facility] 24/7 assessment services.         This document has been electronically signed by Norma Bond MD.  April 3, 2025 14:31 EDT

## 2025-05-06 ENCOUNTER — TELEPHONE (OUTPATIENT)
Age: 35
End: 2025-05-06

## 2025-05-06 ENCOUNTER — TELEMEDICINE (OUTPATIENT)
Age: 35
End: 2025-05-06
Payer: COMMERCIAL

## 2025-05-06 DIAGNOSIS — Z79.899 HIGH RISK MEDICATION USE: ICD-10-CM

## 2025-05-06 DIAGNOSIS — F90.2 ATTENTION DEFICIT HYPERACTIVITY DISORDER, COMBINED TYPE: Primary | ICD-10-CM

## 2025-05-06 RX ORDER — DEXTROAMPHETAMINE SACCHARATE, AMPHETAMINE ASPARTATE, DEXTROAMPHETAMINE SULFATE AND AMPHETAMINE SULFATE 3.75; 3.75; 3.75; 3.75 MG/1; MG/1; MG/1; MG/1
TABLET ORAL
Qty: 60 TABLET | Refills: 0 | Status: SHIPPED | OUTPATIENT
Start: 2025-05-06 | End: 2025-06-05 | Stop reason: SDUPTHER

## 2025-05-06 NOTE — PATIENT INSTRUCTIONS
- Stop taking Adderall IR 10 mg every morning.  - Start taking Adderall IR 15 mg every morning. After 3-4 days, if you are tolerating the medication well, you may start taking a second 15 mg dose every afternoon after lunch, approx 1-2 hours prior to effects from initial dose wearing off.   - If you develop decreased appetite after adjusting Adderall dose, you may return to 15 mg every morning with no afternoon dose, or consider skipping your doses on non-work days.  - Urine drug screen has been ordered for [facility]. You may go at your convenience for collection. Please ensure that you have been taking your Adderall regularly prior to UDS collection.

## 2025-05-06 NOTE — PROGRESS NOTES
Psychiatry Follow-up Note     Name: Ambrose Powers    : 1990     MRN: [id number]     Chief Complaint  Medication management follow-up for ADHD    Subjective     This provider is located at their office at [facility], located at [address] [place] [address], using a secure MyChart Video Visit through [employer]. Patient is being seen remotely via telehealth at their home residence in [place]. The patient's condition being diagnosed/treated is appropriate for telemedicine. The provider identified herself as well as her credentials. The patient gives consent to be seen remotely, but they may refuse to be seen remotely at any time.    Patient or patient representative verbalized consent for the use of Ambient Listening during the visit with  Norma Bond MD for chart documentation. 2025  15:01 EDT    History of Present Illness  The patient presents via virtual visit for ADHD.    He reports a positive initial response to Adderall, with noticeable improvement in fidgeting and attention span during the first few days of treatment. However, he experienced a decline in these benefits after approximately 4 days. Despite this, he continues to take the medication and has observed an enhancement in his sleep quality. His appetite remains stable, and he maintains a consistent diet. He also notes that the effects of Adderall seem to diminish around 3:00 or 4:00 PM. He continues to work from home for now, and anticipates that it may be several more months before he is able to get back out into the field. His mood is reported as good, and he does not experience any hallucinations, suicidal ideations, or homicidal thoughts. He has not had any major medical issues or illnesses since our last consultation. He is currently on Adderall and has one pill remaining. He expresses concern about potential withdrawal symptoms from Adderall, as he plans to travel in 2025 to a location where the medication is  prohibited. Advised pt that no significant withdrawal symptoms are expected, but that he would return to baseline level of concentration/focus impairment while off of the medication. Discussed increasing AM dose of Adderall IR due to waning effect several days after initiation. As pt sometimes works beyond typical office hours, he was advised to add second 15 mg dose in the early afternoon if tolerating increased morning dose well for at least several days.        Medications:     Current Outpatient Medications:     amphetamine-dextroamphetamine (Adderall) 10 MG tablet, Take 1 tablet by mouth Daily With Breakfast., Disp: 30 tablet, Rfl: 0    multivitamin with minerals tablet tablet, Take 1 tablet by mouth Daily., Disp: , Rfl:     Allergies:   No Known Allergies    Family History:   Family History   Problem Relation Age of Onset    No Known Problems Mother     No Known Problems Father        Social History:   Social History     Socioeconomic History    Marital status:    Tobacco Use    Smoking status: Former     Current packs/day: 0.00     Average packs/day: 1 pack/day for 3.0 years (3.0 ttl pk-yrs)     Types: Cigarettes     Start date: 2015     Quit date: 2018     Years since quittin.2    Smokeless tobacco: Never    Tobacco comments:     Switched to e cigarettes 5 years ago. Nicotine free as of    Vaping Use    Vaping status: Never Used   Substance and Sexual Activity    Alcohol use: Yes     Alcohol/week: 5.0 standard drinks of alcohol     Types: 5 Cans of beer per week     Comment: 2-4 drinks every other week    Drug use: Never    Sexual activity: Yes     Partners: Female     Birth control/protection: Condom, I.U.D.       Objective     Vital Signs:   Unable to be obtained due to telehealth visit    Physical Exam  General Physical Appearance:  The patient appears well-nourished and in no acute distress. He maintains good eye contact and is appropriately dressed for the season.  Skin:  No  signs of self-harm or physical abuse noted. Skin appears intact with no visible lesions or abnormalities.  General Behavior:  The patient is cooperative and engages appropriately during the session.  Speech Characteristics:  Speech is clear, coherent, and of normal rate and volume.  Mood and Affect:  The patient reports feeling good. Affect is congruent with mood and appropriate to the context.  Thought Processes:  Thought processes are logical and goal-directed.  Thought Content:  No evidence of delusions or preoccupations.  Harmful Thoughts:  The patient denies any suicidal or homicidal ideation.  Perceptual Disturbances:  The patient denies any hallucinations.  Sensorium and Cognitive Functions:  The patient is alert and oriented to person, place, and time. Cognitive functions appear intact.  Insight and Judgment:  The patient demonstrates good insight into his condition and the need for medication adjustments. Judgment appears sound.  Motivation:  The patient shows motivation to manage his ADHD symptoms effectively and is compliant with treatment recommendations.       Current Medications:   Current Outpatient Medications   Medication Sig Dispense Refill    amphetamine-dextroamphetamine (Adderall) 10 MG tablet Take 1 tablet by mouth Daily With Breakfast. 30 tablet 0    multivitamin with minerals tablet tablet Take 1 tablet by mouth Daily.       No current facility-administered medications for this visit.       Physical Exam  Constitutional:       General: He is not in acute distress.     Appearance: Normal appearance. He is not ill-appearing.   HENT:      Head: Normocephalic and atraumatic.   Eyes:      Extraocular Movements: Extraocular movements intact.   Pulmonary:      Effort: No respiratory distress.   Neurological:      General: No focal deficit present.      Mental Status: He is alert.   Psychiatric:         Mood and Affect: Mood normal.         Behavior: Behavior normal.         Thought Content: Thought  content normal.         Judgment: Judgment normal.     Assessment and Plan        Assessment & Plan     Diagnosis Plan   1. Attention deficit hyperactivity disorder, combined type  Urine Drug Screen - Urine, Clean Catch      2. High risk medication use  Urine Drug Screen - Urine, Clean Catch          The patient reports initial improvement in ADHD symptoms with Adderall, particularly in fidgeting and concentration, but noted a tapering effect after a few days. Sleep has improved, and there are no significant negative side effects. Appetite remains consistent despite previous issues with Strattera. The patient is motivated to optimize ADHD treatment in anticipation of changes in work responsibilities. Mood is stable, with no reported hallucinations, suicidal, or homicidal thoughts.      Plan:  - Increase immediate release morning dose of Adderall to 15 mg and take for 3-4 days. Assess the point at which the medication's effect diminishes. If no adverse effects noted, introduce a second daily 15 mg dose approximately an hour before concentration wanes, ideally post-lunch.  - Monitor appetite changes and adjust the dosing schedule if necessary.  - Consider taking a break from medication over the weekend if appetite issues arise.  - SELENE reviewed, no concerns.   - Pt instructed to schedule a urine drug screen at [facility] location to be collected at least 1 day prior to the next appointment.  - Schedule follow-up visit in 1 month.      Notes & Risk Factors:  - No risk factors for harm to self or others reported.  - Protective factors include stable mood and supportive family environment.      I spent 32 minutes caring for Ambrose on this date of service. This time includes time spent by me in the following activities:preparing for the visit, performing a medically appropriate examination and/or evaluation , counseling and educating the patient/family/caregiver, ordering medications, tests, or procedures, and  documenting information in the medical record    Follow Up   Return in about 1 month (around 6/6/2025) for Video visit, Recheck.        This document has been electronically signed by Norma Bond MD  May 6, 2025 14:55 EDT

## 2025-05-21 ENCOUNTER — TELEPHONE (OUTPATIENT)
Age: 35
End: 2025-05-21
Payer: COMMERCIAL

## 2025-05-21 NOTE — TELEPHONE ENCOUNTER
Spoke with patient and reminded him that Dr Bond wanted him to have a drug screen done at hospital, he states that he is going tomorrow

## 2025-05-22 ENCOUNTER — LAB (OUTPATIENT)
Dept: LAB | Facility: HOSPITAL | Age: 35
End: 2025-05-22
Payer: COMMERCIAL

## 2025-05-22 DIAGNOSIS — Z79.899 HIGH RISK MEDICATION USE: ICD-10-CM

## 2025-05-22 DIAGNOSIS — F90.2 ATTENTION DEFICIT HYPERACTIVITY DISORDER, COMBINED TYPE: ICD-10-CM

## 2025-05-22 LAB
AMPHET+METHAMPHET UR QL: POSITIVE
AMPHETAMINES UR QL: NEGATIVE
BARBITURATES UR QL SCN: NEGATIVE
BENZODIAZ UR QL SCN: NEGATIVE
BUPRENORPHINE SERPL-MCNC: NEGATIVE NG/ML
CANNABINOIDS SERPL QL: NEGATIVE
COCAINE UR QL: NEGATIVE
METHADONE UR QL SCN: NEGATIVE
OPIATES UR QL: NEGATIVE
OXYCODONE UR QL SCN: NEGATIVE
PCP UR QL SCN: NEGATIVE
TRICYCLICS UR QL SCN: NEGATIVE

## 2025-05-22 PROCEDURE — 80306 DRUG TEST PRSMV INSTRMNT: CPT

## 2025-06-05 ENCOUNTER — TELEMEDICINE (OUTPATIENT)
Age: 35
End: 2025-06-05
Payer: COMMERCIAL

## 2025-06-05 ENCOUNTER — TELEPHONE (OUTPATIENT)
Age: 35
End: 2025-06-05
Payer: COMMERCIAL

## 2025-06-05 DIAGNOSIS — F90.2 ATTENTION DEFICIT HYPERACTIVITY DISORDER, COMBINED TYPE: Primary | ICD-10-CM

## 2025-06-05 RX ORDER — DEXTROAMPHETAMINE SACCHARATE, AMPHETAMINE ASPARTATE, DEXTROAMPHETAMINE SULFATE AND AMPHETAMINE SULFATE 3.75; 3.75; 3.75; 3.75 MG/1; MG/1; MG/1; MG/1
15 TABLET ORAL 2 TIMES DAILY
Qty: 60 TABLET | Refills: 0 | Status: SHIPPED | OUTPATIENT
Start: 2025-06-05

## 2025-06-05 NOTE — PROGRESS NOTES
Psychiatry Follow-up Note     Name: Ambrose Powers    : 1990     MRN: [id number]     Chief Complaint  Follow-up for ADHD    Subjective     This provider is located at their office at [facility], located at [address] [place] [address], using a secure MyChart Video Visit through [employer] Patient is being seen remotely via telehealth at their home residence in [place]. The patient's condition being diagnosed/treated is appropriate for telemedicine. The provider identified herself as well as her credentials. The patient gives consent to be seen remotely, but they may refuse to be seen remotely at any time.    Patient or patient representative verbalized consent for the use of Ambient Listening during the visit with  Norma Bond MD for chart documentation. 2025  14:33 EDT    History of Present Illness  The patient presents via virtual visit for medication follow-up for ADHD.    The patient reports a positive response to the recent adjustment in his medication regimen, with a noticeable improvement in energy levels throughout the day. Initially, he experienced sleep disturbances following the dosage increase, but these have largely resolved. His sleep quality remains satisfactory, with an average duration of 6 to 8 hours per night. He does not experience fatigue upon waking. His appetite is unaffected, and he maintains a stable mood. He has been engaged in rebuilding his deck over the past week and has observed an improvement in his concentration levels later in the day.    Social History:  - Primarily works from home, and is waiting for change in job responsibilities so that he will be out in the field more  - Planning a leisure trip ([place]) to [place] with his wife in 2025; Adderall is not legal there, and pt plans to temporarily increase caffeine consumption if he experiences any significant fatigue without Adderall    Pertinent Negatives: No fatigue upon waking, no issues  related to his job, no adverse effects on appetite, and no mood instability.    25 UDS results reviewed, as expected:    Results for orders placed or performed in visit on 25   Urine Drug Screen - Urine, Clean Catch    Collection Time: 25 12:34 PM    Specimen: Urine, Clean Catch   Result Value Ref Range    THC, Screen, Urine Negative Negative    Phencyclidine (PCP), Urine Negative Negative    Cocaine Screen, Urine Negative Negative    Methamphetamine, Ur Negative Negative    Opiate Screen Negative Negative    Amphetamine Screen, Urine Positive (A) Negative    Benzodiazepine Screen, Urine Negative Negative    Tricyclic Antidepressants Screen Negative Negative    Methadone Screen, Urine Negative Negative    Barbiturates Screen, Urine Negative Negative    Oxycodone Screen, Urine Negative Negative    Buprenorphine, Screen, Urine Negative Negative       Medications:     Current Outpatient Medications:     amphetamine-dextroamphetamine (Adderall) 15 MG tablet, Take 1 tab by mouth every morning after breakfast. If tolerating well after 3-4 days, increase to 1 tab by mouth twice daily, morning and early afternoon., Disp: 60 tablet, Rfl: 0    multivitamin with minerals tablet tablet, Take 1 tablet by mouth Daily., Disp: , Rfl:     Allergies:   No Known Allergies    Family History:   Family History   Problem Relation Age of Onset    No Known Problems Mother     No Known Problems Father        Social History:   Social History     Socioeconomic History    Marital status:    Tobacco Use    Smoking status: Former     Current packs/day: 0.00     Average packs/day: 1 pack/day for 3.0 years (3.0 ttl pk-yrs)     Types: Cigarettes     Start date: 2015     Quit date: 2018     Years since quittin.3    Smokeless tobacco: Never    Tobacco comments:     Switched to e cigarettes 5 years ago. Nicotine free as of    Vaping Use    Vaping status: Never Used   Substance and Sexual Activity    Alcohol  use: Yes     Alcohol/week: 5.0 standard drinks of alcohol     Types: 5 Cans of beer per week     Comment: 2-4 drinks every other week    Drug use: Never    Sexual activity: Yes     Partners: Female     Birth control/protection: Condom, I.U.D.       Objective     Vital Signs:   Unable to be obtained due to telehealth visit    Physical Exam  General Physical Appearance:  The patient appears well-nourished and in no acute distress. He is appropriately dressed and groomed for the season and setting.  Skin:  No signs of self-harm or physical abuse noted. Skin is intact without any rashes or lesions.  General Behavior:  The patient is cooperative and engages appropriately throughout the session.  Speech Characteristics:  Speech is normal in rate, rhythm, and volume.  Mood and Affect:  The patient's mood is reported as stable. Affect is congruent with mood and appropriate to the context.  Thought Processes:  Thought processes are logical and coherent.  Thought Content:  No evidence of delusions or preoccupations.  Harmful Thoughts:  The patient denies any suicidal or homicidal ideation.  Perceptual Disturbances:  No hallucinations or other perceptual disturbances reported.  Sensorium and Cognitive Functions:  The patient is alert and oriented to person, place, and time. Cognitive functions appear intact.  Insight and Judgment:  The patient demonstrates good insight into his condition and the effects of his medication. Judgment is sound.  Motivation:  The patient shows motivation to continue with the current treatment plan and expresses satisfaction with the medication adjustments.     Current Medications:   Current Outpatient Medications   Medication Sig Dispense Refill    amphetamine-dextroamphetamine (Adderall) 15 MG tablet Take 1 tab by mouth every morning after breakfast. If tolerating well after 3-4 days, increase to 1 tab by mouth twice daily, morning and early afternoon. 60 tablet 0    multivitamin with minerals  tablet tablet Take 1 tablet by mouth Daily.       No current facility-administered medications for this visit.       Assessment and Plan          Assessment & Plan     Diagnosis Plan   1. Attention deficit hyperactivity disorder, combined type  amphetamine-dextroamphetamine (Adderall) 15 MG tablet          The patient exhibits a positive response to the adjusted dosage of his ADHD medication, with improved energy and concentration. Initial sleep disturbances have largely resolved, and he reports stable appetite and mood. Overall, the patient's mental health appears stable with effective symptom management.    Plan:  - Limit caffeine intake to no later than 2:00 PM, but utilize available caffeine sources during travel if experiencing fatigue.  - Prescription refill for Adderall to be sent to pharmacy. Maintaining on Adderall IR 15 mg BID (morning and early afternoon) without change due to pt's robust response and lack of significant adverse effects.   - Contact the clinic if there are difficulties obtaining medication.  - Return to clinic in 3 months. Pt to schedule an earlier appointment or phone consultation if concentration issues arise during fieldwork.  - We discussed risks, benefits, and side effects of the above medications and the patient was agreeable with the plan.   - Patient was educated on the importance of compliance with treatment and follow-up appointments.    - SELENE reviewed today, as expected. 5/22/25 UDS + amphetamines, also as expected.    Follow-up:  The patient will follow up in 3 months.      Follow Up   Return in about 3 months (around 9/5/2025) for Recheck, Video visit.      This document has been electronically signed by Norma Bond MD  June 5, 2025 14:29 EDT

## 2025-07-03 ENCOUNTER — TELEPHONE (OUTPATIENT)
Age: 35
End: 2025-07-03
Payer: COMMERCIAL

## 2025-07-03 NOTE — TELEPHONE ENCOUNTER
Lm for patient letting him know that we have cancelled his Sept appt with Dr Bond as she has left the practice, we are needing him to call back so we can set up/refer to another provider-  mychart message sent as well

## 2025-07-07 ENCOUNTER — PATIENT MESSAGE (OUTPATIENT)
Dept: INTERNAL MEDICINE | Facility: CLINIC | Age: 35
End: 2025-07-07
Payer: COMMERCIAL

## 2025-07-07 NOTE — TELEPHONE ENCOUNTER
SPOKE WITH PATIENT AND HE SAID THAT HE IS GOING TO DISCUSS WITH HIS PCP TO SEE IF HE WILL ASSUME CARE OF MANAGING HIS  ADHD MEDICATION, IF NOT, HE WILL CALL ME BACK AND LET ME KNOW AND WE WILL SEND REFERRAL TO [facility]

## 2025-07-09 ENCOUNTER — TELEPHONE (OUTPATIENT)
Age: 35
End: 2025-07-09
Payer: COMMERCIAL

## 2025-07-09 DIAGNOSIS — F90.2 ATTENTION DEFICIT HYPERACTIVITY DISORDER, COMBINED TYPE: ICD-10-CM

## 2025-07-09 DIAGNOSIS — F90.2 ATTENTION DEFICIT HYPERACTIVITY DISORDER, COMBINED TYPE: Primary | ICD-10-CM

## 2025-07-09 RX ORDER — DEXTROAMPHETAMINE SACCHARATE, AMPHETAMINE ASPARTATE, DEXTROAMPHETAMINE SULFATE AND AMPHETAMINE SULFATE 3.75; 3.75; 3.75; 3.75 MG/1; MG/1; MG/1; MG/1
15 TABLET ORAL 2 TIMES DAILY
Qty: 60 TABLET | Refills: 0 | Status: SHIPPED | OUTPATIENT
Start: 2025-07-09

## 2025-07-09 NOTE — TELEPHONE ENCOUNTER
I will send in a 30-day supply at this time.  No additional refills will be sent until patient is scheduled with the virtual clinic.  Once scheduled, I will continue to provide patient's prescription for Adderall until he is seen.

## 2025-07-09 NOTE — TELEPHONE ENCOUNTER
Patient called stating his PCP cannot assume prescribing his Adderall that was originally prescribed by Dr Bond.  Patient would like to get established with another provider.  I offered [place] office since patient lives in [place] and patient declined, said he had to have virtual clinic.  I told him I would send referral today and someone would call him to schedule.    Patient then asked if someone could refill his Adderall until he is seen.    Rx Refill Note  Requested Prescriptions      No prescriptions requested or ordered in this encounter      Last office visit with prescribing clinician: Visit date not found   Last telemedicine visit with prescribing clinician: 6/5/2025   Next office visit with prescribing clinician: referral sent to virtual clinic    Current UDS on file? [x] Yes [] No  Lab :Urine Drug Screen - Urine, Clean Catch (05/22/2025 12:34)     New selene in media? [x] Yes [] No    SELENE - SCAN - SELENE 7.9.25 (07/09/2025)

## 2025-07-09 NOTE — TELEPHONE ENCOUNTER
OK, REFERRAL SENT TO VIRTUAL CLINIC TODAY AND THEY HAVE SCHEDULED HIM FOR 8.18.25.  PATIENT INFORMED SCRIPT SENT TO HIS PHARMACY

## 2025-08-08 DIAGNOSIS — F90.2 ATTENTION DEFICIT HYPERACTIVITY DISORDER, COMBINED TYPE: ICD-10-CM

## 2025-08-11 RX ORDER — DEXTROAMPHETAMINE SACCHARATE, AMPHETAMINE ASPARTATE, DEXTROAMPHETAMINE SULFATE AND AMPHETAMINE SULFATE 3.75; 3.75; 3.75; 3.75 MG/1; MG/1; MG/1; MG/1
15 TABLET ORAL 2 TIMES DAILY
Qty: 60 TABLET | Refills: 0 | Status: SHIPPED | OUTPATIENT
Start: 2025-08-11

## 2025-08-18 ENCOUNTER — TELEMEDICINE (OUTPATIENT)
Dept: PSYCHIATRY | Facility: CLINIC | Age: 35
End: 2025-08-18
Payer: COMMERCIAL

## 2025-08-18 DIAGNOSIS — F90.2 ATTENTION DEFICIT HYPERACTIVITY DISORDER, COMBINED TYPE: Primary | ICD-10-CM

## 2025-08-18 PROBLEM — Z78.9 ELECTRONIC CIGARETTE USE: Status: ACTIVE | Noted: 2025-08-18

## 2025-08-18 PROCEDURE — 99214 OFFICE O/P EST MOD 30 MIN: CPT | Performed by: NURSE PRACTITIONER

## 2025-08-18 PROCEDURE — 96127 BRIEF EMOTIONAL/BEHAV ASSMT: CPT | Performed by: NURSE PRACTITIONER
